# Patient Record
(demographics unavailable — no encounter records)

---

## 2024-10-30 NOTE — PHYSICAL EXAM
[de-identified] : Lumbar Physical Exam    Inspection: No evidence of scoliosis.    Palpation:   Tenderness: Moderate-significant Location: Right-sided lumbar paraspinals    ROM Flexion: Limited Extension: Limited Rotation: normal  Lateral Bending: normal     Straight leg test: Positive Slump test: Positive  Sensation: Normal to light touch   Motor: 5/5 strength throughout L2-S1  Deep tendon reflexes are symmetrical, 2+ on bilateral knee, 2+ on bilateral ankles    Clonus: Negative Babinski: Negative [de-identified] : XR of lumbar spine Date: 10/29/2024   Views: 2 views Performed at [facility]: Yes Impression: No fracture no dislocation good alignment.  These images were personally reviewed with original findings documented as above.

## 2024-10-30 NOTE — HISTORY OF PRESENT ILLNESS
[de-identified] : YENY AMARAL is a 55 year old female presenting with 2 to 3-month history of right-sided shoulder pain she is right-hand dominant.  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  She says the right shoulder pain started atraumatically and has become significantly painful.  She states she cannot reach behind her back or overhead without significant pain and feels that the arm is stuck in certain positions.  She is here today for further evaluation.  Interval history: Patient states she had significant improvement in her shoulder pain from injection and medicine but did not go to physical therapy.  Is having an acute on chronic flareup of low back pain radiating down her right leg that has become unbearable over the last week.  Denies any specific incident but states she has had similar issues in the past.  Endorses burning pain numbness and tingling down to the foot.

## 2024-10-30 NOTE — DISCUSSION/SUMMARY
[de-identified] : YENY AMARAL is a 55 year old female presenting with 2 to 3-month history of progressive right-sided shoulder pain and decreased range of motion consistent with adhesive capsulitis.  X-ray also reveals mild glenohumeral joint osteoarthritis.  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  Patient with interval significant improvement of shoulder pain however here for new complaint of acute on chronic right sided radiating significant low back pain consistent with acute on chronic lumbar radiculopathy.  Plan: 1.  Lumbar MRI ordered and referral to pain management for possible epidural intervention 2.  Diclofenac 75 mg refilled 3.  Gabapentin 300 mg nightly prescribed can increase to 600 mg if tolerated for 1 week 4.  Patient will follow-up with pain management

## 2024-11-20 NOTE — PHYSICAL EXAM
[FreeTextEntry1] : General exam   Constitutional: The patient appears well-developed, well-nourished, and in no apparent distress. Patient is well-groomed.    Skin: The skin is warm and dry, with normal turgor.  Eyes: PERRL.    ENMT: Ears: Hearing is grossly within normal limits.    Neck: Supple: The neck is supple.    Respiratory: Inspection: Breathing unlabored.    Neurologic: Alert and oriented x 3.   Psychiatric: Patient is cooperative and appropriate.  Mood and affect are normal.  Patient's insight is good, and memory and judgment are intact.  LUMBAR EXAM  APPEARANCE: No visible scars Truncal obesity No gross deformity or malalignment No erythema, swelling or ecchymosis Normal lumbar lordosis No muscle atrophy of the left lower extremity No muscle atrophy of the right lower extremity  TENDERNESS: +trigger points over RIGHT lumbar paraspinal muscles Absent over midline spinous processes Absent over left lumbar facet joints Absent over right lumbar facet joints  Absent over left lumbar paraspinal muscles: erector spinae and quadratus lumborum +over right lumbar paraspinal muscles: erector spinae and quadratus lumborum Absent over left sacroiliac joint Absent over right sacroiliac joint  ROM: Limited A/PROM of the lumbar spine +pain with flexion, extension of the lumbar spine +hamstring tightness on the left +hamstring tightness on the right  SPECIAL TESTS: Normal left straight leg raising test +right straight leg raising test FABERE left normal FABERE right normal FADIR left normal FADIR right normal  SENSORY TESTING: Intact to light touch Left L1-S2 Intact to light touch Right L1-S2  MOTOR TESTING: Muscle tone of the left lower extremity is normal Muscle tone of the right lower extremity is normal  Left hip flexion strength is 5/5 Right hip flexion strength is 5/5  Left quadriceps strength is 5/5 Right quadriceps strength is 5/5  Left ankle dorsiflexion strength is 5/5 Right ankle dorsiflexion strength is 5/5  Left ankle plantar flexion strength is 5/5 Right ankle plantar flexion strength is 5/5  REFLEXES: Patella (L4) left 0+ Patella (L4) right 0+   GAIT: Non-antalgic gait Able to walk on toes Able to walk on heels Balance normal with ambulation

## 2024-11-20 NOTE — ASSESSMENT
[FreeTextEntry1] : Ms. YENY AMARAL is a 55 year F with pain in the lower back on the RIGHT with radiation down the leg to the FOOT. She reports chronic long standing pain and is noting an acute on chronic exacerbation of this pain due to lumbar radiculopathy. There are no myelopathic signs on today's exam.  Patient reassured and educated on the diagnosis and treatment options. Risks and benefits of treatment and of delaying treatment discussed with patient. Risks discussed include but not limited to: progression of symptoms, worsening pain and functional status, etc.  This note was generated using Dragon medical dictation software. A reasonable effort had been made for proofreading its contents, but spelling mistakes or grammatical errors may still remain. If there are any questions or points of clarification needed please notify my office.  Chart reviewed Dr. Jimenez's note reviewed in HPI MRI L spine reviewed: "L4-L5: Moderate-sized right paracentral disc herniation which contacts the descending right L5 nerve root"  Start Diclofenac 75mg PO q12 x 30 days PRN pain with food #60. Denies CKD, CAD, or gastritis. Recommend that if patient develops GI symptoms including abdominal pain, nausea, or vomiting to discontinue use of medication immediately.  Patient was prescribed Gabapentin 300mg PO QHS for neuropathic pain. Monitor for sedation, dizziness and any signs of respiratory depression. Avoid taking together with opioid pain medicines and other drugs that depress the central nervous system function. Avoid sudden cessation after prolonged use, due to risk of seizures. Risks and benefits were explained and patient expressed understanding.  Sending patient for PT for LOWER BACK PAIN to help relieve pain and improve function. Stretching, strengthening, ROM, home education and other appropriate interventions. Precautions include fall prevention.  Follow up 1-2 months  Patient was advised if the following symptoms develop: chills, fever, loss of bladder control, bowel incontinence or urinary retention, numbness/tingling or weakness is present in upper or lower extremities, to go to the nearest emergency room. This may be a new clinical condition not present at the time of the patient visit that may lead to paralysis and/or death. Patient advised if the above symptoms developed to also call the office immediately to inform us and to go to the nearest emergency room.

## 2024-11-20 NOTE — HISTORY OF PRESENT ILLNESS
[Back] : back [___ yrs] : [unfilled] year(s) ago [5] : a current pain level of 5/10 [7] : a maximum pain level of 7/10 [Sharp] : sharp [Shooting] : shooting [Right] : right [Thigh] : thigh [Calf] : calf [Tingling] : tingling [Standing] : standing [Walking] : walking [Other: ___] : [unfilled] [Improved] : have improved [Medications] : medications [Did the prior interventions help?] : The intervention(s) helped [FreeTextEntry1] : YENY AMARAL is an 55 year old F here for initial evaluation of right lower back pain. Patient is a high school . She states the pain initially started about a year ago and was intermittent. She had a recent flare up about a month ago, 7/10 in severity. She saw Dr. Jimenez and had x-rays and MRI done. She was prescribed Diclofenac and Gabapentin for the pain, which she states she took for about a week and improved her symptoms, 5/10 in severity currently. Physical therapy was recommended however patient did not attend due to busy schedule.   Pain location: Right lower back Quality: Sharp, Shooting Radiation: Posterior right leg Severity: 5/10 Onset: 1 year ago. Recent flare up about a month ago. Associated symptoms: Tingling Numbness: Admits, right leg Weakness: Denies Exacerbated by: Standing, walking, turning in bed Improved by: lifting right foot when standing Bowel or bladder involvement: Denies   The pain interferes with function, ADLs and quality of life: Admits   Denies bowel/bladder dysfunction, saddle anesthesia, fevers, chills, weight loss, night pain, or night sweats at this time.  Patient had tried diclofenac and gabapentin without any lasting relief of pain.   Patient had imaging studies to evaluate the pain. Patient had not had any nerve conduction studies to evaluate the symptoms. Patient had not tried physical therapy, and/or physician directed home exercises, stretching, lifestyle modification [de-identified] : tingling [FreeTextEntry9] : Recent flare up has been improving

## 2025-01-09 NOTE — PHYSICAL EXAM
[Alert] : alert [Oriented to Person] : oriented to person [Oriented to Place] : oriented to place [Oriented to Time] : oriented to time [Calm] : calm [de-identified] : She  is alert, well-groomed, and in NAD   [de-identified] : anicteric.  Nasal mucosa pink, septum midline. Oral mucosa pink.  Tongue midline, Pharynx without exudates.   [de-identified] : Neck supple. Trachea midline. Thyroid isthmus barely palpable, lobes not felt.   [de-identified] :  right forehead mass is  mobile, Firm,  Smooth, non-tender,   Well defined.  deep. No palpable lymph nodes.   Mass size - 4 cm x 4  cm.

## 2025-01-09 NOTE — HISTORY OF PRESENT ILLNESS
[de-identified] : This is a 56 year  old patient who was referred by Dr. Malachi Muir  with the chief complaint of having  right forehead mass.  She reports having this condition for over 30 years She reports hitting her head on the dashboard.    She denies any fever or  night sweats. Appetite is good and weight is stable.  She states that recently the mass started to  get  bigger and  more symptomatic. She wants to know if it could  be surgically  removed.

## 2025-01-09 NOTE — CONSULT LETTER
[Dear  ___] : Dear  [unfilled], [Consult Letter:] : I had the pleasure of evaluating your patient, [unfilled]. [Please see my note below.] : Please see my note below. [Consult Closing:] : Thank you very much for allowing me to participate in the care of this patient.  If you have any questions, please do not hesitate to contact me. [Sincerely,] : Sincerely, [FreeTextEntry3] : Bebeto Pinto MD, FACS

## 2025-01-09 NOTE — PLAN
[FreeTextEntry1] : Ms. AMARAL  was told significance of findings, options, risks and benefits were explained.  Informed consent for excision right forehead mass , and potential risks, benefits and alternatives (surgical options were discussed including non-surgical options or the option of no surgery) to the planned surgery were discussed in depth.  All surgical options were discussed including non-surgical treatments.  She wishes to proceed with surgery.  We will plan for surgery on at the next available date, pending any required insurance pre-certification or pre-approval. She agrees to obtain any necessary pre-operative evaluations and testing prior to surgery. Patient advised to seek immediate medical attention with any acute change in symptoms or with the development of any new or worsening symptoms.  Patient's questions and concerns addressed to patient's satisfaction, and patient verbalized an understanding of the information discussed.

## 2025-01-15 NOTE — DISCUSSION/SUMMARY
[de-identified] : YENY AMARAL is a 55 year old female presenting with 2 to 3-month history of progressive right-sided shoulder pain and decreased range of motion consistent with adhesive capsulitis.  X-ray also reveals mild glenohumeral joint osteoarthritis.  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  Patient with 2-week history of left-sided neck and periscapular pain consistent with levator scapulae syndrome especially in the setting of patient carrying heavy bag on the left side of her last few weeks.  X-ray does reveal mild degenerative changes at the greater tuberosity and possible small calcific tendinopathy however does not appear to be symptomatic.  Patient is most symptomatic on her exam over the levator scapulae border.  Plan: 1.  Reassurance this pain will improve on its own 2.  Recommend continuing heating pad and warm washcloth that can be helpful for pain and spasms 3.  Patient will use diclofenac as needed 4.  Discussed possible muscle relaxer however patient would like to avoid 5.  Patient will follow-up as needed

## 2025-01-15 NOTE — HISTORY OF PRESENT ILLNESS
[de-identified] : YENY AMARAL is a 55 year old female presenting with 2 to 3-month history of right-sided shoulder pain she is right-hand dominant.  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  She says the right shoulder pain started atraumatically and has become significantly painful.  She states she cannot reach behind her back or overhead without significant pain and feels that the arm is stuck in certain positions.  She is here today for further evaluation.  Interval history: Patient states she has had significant improvement in terms of the right shoulder however is here today for left-sided shoulder pain.  She states it started 2 weeks ago and is mostly at the base of the neck down to the shoulder blade.  She states she has been carrying her bag on the left side more frequently and is not sure if that caused the issue.  She also had COVID 1 week ago which may have exacerbated the symptoms.  She is here today for further evaluation.

## 2025-01-15 NOTE — PHYSICAL EXAM
[de-identified] : Shoulder (left)   Inspection  Skin: normal  Scapular winging: none   Palpation  Tenderness: Moderate Location: Levator scapula insertion of the medial border, radiating up to the base of the neck  ROM  Flexion- normal  Abduction - normal  Rotation (internal) - normal  Rotation (external) - normal    Motor Strength  Flexion- 5/5  Abduction - 5/5  Rotation (internal) - 5/5  Rotation (external) - 5/5   Stability- normal  Sensory index- normal   Special Tests  Impingement-Cristina and Neer's positive Neer's, negative Cristina Empty Can/Painful Arc-negative Speed test- normal  Liftoff- normal Obriens test-normal SAT/SRT-negative [de-identified] : XR of left shoulder Date: 1/15/2025   Views: 3 views Performed at [facility]: Yes Impression: Mild degenerative changes noted at the greater tuberosity with possible very small mild calcific tendinopathy.  These images were personally reviewed with original findings documented as above.

## 2025-02-19 NOTE — ASSESSMENT
[FreeTextEntry1] : Pt is a 57 y/o female with HTN, obesity and hypothyroidism.  Subclinical hypothyroidism Saw Dr. Rider briefly around COVID. Took 50mcg levothyroxine. Vacationed in [place] and took 100mcg (self-increased dose, didn't need Rx over there). Hadn't been on any thyroid medicine for a few months. PCP started 25mcg LT4 for subclinical hypothyroidism but patient didn't have any symptoms before starting and feels the same on it so wants to do trial off. I agree with this approach.  Obesity -Referral to RD declined -Advised caution with use of compound pharmacies. Will try for zepbound 2.5mg weekly x4 weeks then increase to 5 mg weekly if tolerating lower dose. Discussed risks/benefits/side effects/alternatives. No hx of pancreatitis. No longer has gallbladder due to stones), or personal or FHx of thyroid cancer or pancreatic cancer.  Hyperlipidemia Possible FH Sept 2024 lipoprotein a significantly elevated at 178.6 with  with low HDL at 39 and normal triglycerides. This cholesterol panel places patient at significant risk for cardiovascular disease and so patient started Crestor 40 mg daily. Will also consider genetic testing for FH. She reports she is deconditioned but otherwise does not have significant dyspnea on exertion. When she exerts herself she hears pounding in her chest that radiates up to her ears but no actual chest pain or pressure. No nausea or vomiting. She does not eat meat or fried foods. Does not eat a lot of cheese. States carbs is her weakness, not fat. Her mother is on atorvastatin and has heart failure but otherwise no family history of early heart disease. Eldest son (35 y/o) was placed on cholesterol medication and 15 y/o son had slightly above average for age. Will get CAC score. Repeat , HDL 52, nonHDL 159. -Continue crestor 40mg daily and get CAC scoring -Has an appt to see cardio Dr. Mendez.  RTC 6 months.  Joey Kingsley DO.

## 2025-02-19 NOTE — HISTORY OF PRESENT ILLNESS
[FreeTextEntry1] : Pt is a 57 y/o female with HTN, obesity and hypothyroidism.  First diagnosed with hypothyroidism in 2018. Was gaining weight, couldn't lose it.  Saw Dr. Rider briefly around COVID. Took 50mcg levothyroxine. Vacationed in [place] and took 100mcg (self-increased dose, didn't need Rx over there). Hadn't been on any thyroid medicine for a few months. PCP started 25mcg LT4 for subclinical hypothyroidism but patient didn't have any symptoms before starting and feels the same on it so wants to do trial off. I agree with this approach.  Still with difficulty losing weight. Has heat intolerance. Energy okay. LMP 2 years ago. No compressive symptoms. Takes Vit D and iron intermittently, B12. Takes collagen. Intermittently fasts. Not exercising. No longer using compounded semaglutide. No use of lithium, amiodarone, hx of radiation to head or neck. No prior MOODY, thyroid surgery or prior treatment of hyperthyroidism. No hx of CAD, arrhythmia, CHF.  Sept 2024 lipoprotein a significantly elevated at 178.6 with  with low HDL at 39 and normal triglycerides. This cholesterol panel places patient at significant risk for cardiovascular disease and so patient started Crestor 40 mg daily. Will also consider genetic testing for FH. She reports she is deconditioned but otherwise does not have significant dyspnea on exertion. When she exerts herself she hears pounding in her chest that radiates up to her ears but no actual chest pain or pressure. No nausea or vomiting. She does not eat meat or fried foods. Does not eat a lot of cheese. States carbs is her weakness, not fat. Her mother is on atorvastatin and has heart failure but otherwise no family history of early heart disease. Eldest son (35 y/o) was placed on cholesterol medication and 13 y/o son had slightly above average for age. Will get CAC score. Repeat , HDL 52, nonHDL 159.  FHx: Mother with heart failure. Mom and sister with hypothyroidism.

## 2025-02-19 NOTE — PHYSICAL EXAM
[Alert] : alert [Well Nourished] : well nourished [No Acute Distress] : no acute distress [EOMI] : extra ocular movement intact [No Proptosis] : no proptosis [No Respiratory Distress] : no respiratory distress [No Accessory Muscle Use] : no accessory muscle use [Normal Rate and Effort] : normal respiratory rate and effort [de-identified] : Possible arcus cornealis. [de-identified] : No xanthelasmas. Possible xanthoma on right DIP.

## 2025-04-14 NOTE — PHYSICAL EXAM
[Alert] : alert [Conjunctiva] : the conjunctiva were normal in both eyes [PERRL] : pupils were equal in size, round, and reactive to light [EOM Intact] : extraocular movements were intact [Normal Appearance] : was normal in appearance [Neck Supple] : was supple [Enlarged Diffusely] : was not enlarged [Rate ___] : at [unfilled] breaths per minute [Normal Rhythm/Effort] : normal respiratory rhythm and effort [Clear Bilaterally] : the lungs were clear to auscultation bilaterally [Normal to Percussion] : the lungs were normal to percussion [Normal Rate] : normal [Heart Rate ___] : [unfilled] bpm [Rhythm Regular] : regular [Normal S1] : normal S1 [Normal S2] : normal S2 [S3] : no S3 [S4] : no S4 [No Murmur] : no murmurs heard [No Pitting Edema] : no pitting edema present [Rt] : no varicose veins of the right leg [Lt] : no varicose veins of the left leg [Right Carotid Bruit] : no bruit heard over the right carotid [Left Carotid Bruit] : no bruit heard over the left carotid [Right Femoral Bruit] : no bruit heard over the right femoral artery [Left Femoral Bruit] : no bruit heard over the left femoral artery [2+] : left 2+ [No Abnormalities] : the abdominal aorta was not enlarged and no bruit was heard [Bruit] : no bruit heard [Scar] : a scar was noted [RUQ] : in the right upper quadrant [Periumbilical] : in the periumbilical area [Suprapubic] : in the suprapubic area [Soft, Nontender] : the abdomen was soft and nontender [No Mass] : no masses were palpated [No HSM] : no hepatosplenomegaly noted [None] : no CVA tenderness [Postauricular Lymph Nodes Enlarged Bilaterally] : nodes not enlarged [Preauricular Lymph Nodes Enlarged Bilaterally] : nodes not enlarged [Submandibular Lymph Nodes Enlarged Bilaterally] : nodes not enlarged [Suboccipital Lymph Nodes Enlarged Bilaterally] : nodes not enlarged [Submental Lymph Nodes Enlarged] : nodes not enlarged [Cervical Lymph Nodes Enlarged Posterior Bilaterally] : nodes not enlarged [Cervical Lymph Nodes Enlarged Anterior Bilaterally] : nodes not enlarged [Supraclavicular Lymph Nodes Enlarged Bilaterally] : nodes not enlarged [Axillary Lymph Nodes Enlarged Bilaterally] : nodes not enlarged [Epitrochlear Lymph Nodes Enlarged Bilaterally] : nodes not enlarged [Femoral Lymph Nodes Enlarged Bilaterally] : nodes not enlarged [Inguinal Lymph Nodes Enlarged Bilaterally] : nodes not enlarged [No Lymphangitis] : no lymphangitis observed [Normal Kyphosis] : normal kyphosis [No Visual Abnormalities] : no visible abnormalities [Normal Lordosis] : normal lordosis [No Scoliosis] : no scoliosis [No Tenderness to Palpation] : no spine tenderness on palpation [No Masses] : no masses [Full ROM] : full ROM [No Pain with ROM] : no pain with motion in any direction [Intact] : all reflexes within normal limits bilaterally [Normal Station and Gait] : the gait and station were normal [Normal Motor Tone] : the muscle tone was normal [Involuntary Movements] : no involuntary movements were seen [Normal Scalp] : inspection of the scalp showed no abnormalities [Examination Of The Hair] : texture and distribution of hair was normal [Abnormal Color] : normal color and pigmentation [Complexion Medium] : medium complexion [Skin Lesions 1] : no skin lesions were observed [Tattoo - Single] : no tattoos observed [Skin Turgor Decreased] : normal skin turgor [Normal] : the deep tendon reflexes were normal [Normal Mental Status] : the patient's orientation, memory, attention, language and fund of knowledge were normal [Appropriate] : appropriate [Impaired judgment] : intact judgment [Impaired Insight] : intact insight [de-identified] : tongue normal  teeth in good repair  [de-identified] : bilateral breast exam sitting and supine   sp reduction cystic  bilateral  ana luisa around areola   at 3 ock on left

## 2025-04-14 NOTE — ASSESSMENT
[FreeTextEntry1] : 1 colon cancer screening  WE discussed procedure and will return prior for blood testing and instruction.  Colon and rectal cancer screening is a way in which doctors check the colon and rectum for signs of cancer or growths (called polyps) that might become cancer. It is done in people who have no symptoms and no reason to think they have cancer. The goal is to find and remove polyps before they become cancer, or to find cancer early, before it grows, spreads, or causes problems.  The colon and rectum are the last part of the digestive tract (figure 1). When doctors talk about colon and rectal cancer screening, they use the term "colorectal." That is just a shorter way of saying "colon and rectal." It's also possible to say just colon cancer screening.  Studies show that having colon cancer screening lowers the chance of dying from colon cancer. There are several different types of screening test that can do this.  What are the different screening tests for colon cancer?  They include:  ?Colonoscopy  Colonoscopy allows the doctor to see directly inside the entire colon. Before you can have a colonoscopy, you must clean out your colon. You do this at home by drinking a special liquid that causes watery diarrhea for several hours. On the day of the test, you get medicine to help you relax. Then a doctor puts a thin tube into your anus and advances it into your colon (figure 2). The tube has a tiny camera attached to it, so the doctor can see inside your colon. The tube also has tiny tools on the end, so the doctor can remove pieces of tissue or polyps if they are there. After polyps or pieces of tissue are removed, they are sent to a lab to be checked for cancer.   Advantages of this test  Colonoscopy finds most small polyps and almost all large polyps and cancers. If found, polyps can be removed right away. This test gives the most accurate results. If any other screening tests are done first and come back positive, a colonoscopy will need to be done for follow-up. If you have a colonoscopy as your first test, you will probably not need a second follow-up test soon after.   Drawbacks to this test  Colonoscopy has some small risks. It can cause bleeding or tear the inside of the colon, but this only happens in 1 out of 1,000 people. Also, cleaning out the bowel beforehand can be unpleasant. Plus, people usually cannot work or drive for the rest of the day after the test, because of the relaxation medicine they must take during the test.   Sigmoidoscopy  A sigmoidoscopy is similar to a colonoscopy. The difference is that this test looks only at the last part of the colon, and a colonoscopy looks at the whole colon. Before you have a sigmoidoscopy, you must clean out the lower part of your colon using an enema. This bowel cleaning is not as thorough or unpleasant as the one for colonoscopy. For this test, you do not need to take medicines to help you relax, so you can drive and work afterward if you want.   Advantages of this test  Sigmoidoscopy can find polyps and cancers in the rectum and the last part of the colon. If polyps are found, they can be removed right away.   Drawbacks to this test  In about 2 out of 10,000 people, sigmoidoscopy tears the inside of the colon. The test also can't find polyps or cancers that are in the part of the colon the test does not view (figure 3). If doctors find polyps or cancer during a sigmoidoscopy, they usually follow up with a colonoscopy.   CT colonography (also known as virtual colonoscopy or CTC)  CTC looks for cancer and polyps using a special X-ray called a "CT scan." For most CTC tests, the preparation is the same as it is for colonoscopy.   Advantages of this test  CTC can find polyps and cancers in the whole colon without the need for medicines to relax.   Drawbacks to this test  If doctors find polyps or cancer with CTC, they usually follow up with a colonoscopy. CTC sometimes finds areas that look abnormal but that turn out to be healthy. This means that CTC can lead to tests and procedures you did not need. Plus, CTC exposes you to radiation. In most cases, the preparation needed to clean the bowel is the same as the one needed for a colonoscopy. The test is expensive, and some insurance companies might not cover this test for screening.   Stool test for blood  "Stool" is another word for bowel movements. Stool tests most commonly check for blood in samples of stool. Cancers and polyps can bleed, and if they bleed around the time you do the stool test, then blood will show up on the test. The test can find even small amounts of blood that you can't see in your stool. Other less serious conditions can also cause small amounts of blood in the stool, and that will show up in this test. You will have to collect small samples from your bowel movements, which you will put in a special container you get from your doctor or nurse. Then you follow the instructions to mail the container out for the testing.   Advantages of this test  This test does not involve cleaning out the colon or having any procedures.   Drawbacks to this test  Stool tests are less likely to find polyps than other screening tests. These tests also often come up abnormal even in people who do not have cancer. If a stool test shows something abnormal, doctors usually follow up with a colonoscopy.   Stool DNA test  The stool DNA test checks for genetic markers of cancer, as well as for signs of blood. For this test, you get a special kit in order to collect a whole bowel movement. Then you follow the instructions about how and where to ship it.   Advantages of this test  This test does not involve cleaning out the colon or having any procedures. When cancer is not present, it is less likely to be falsely abnormal than a stool test for blood. That means it leads to fewer unnecessary colonoscopies.   Drawbacks to this test  It might be unpleasant to collect and ship a whole bowel movement. If a DNA test shows something abnormal, doctors usually follow up with a colonoscopy.   There is no blood test that most experts think is accurate enough to use for screening.  How do I choose which test to have?  Work with your doctor or nurse to decide which test is best for you. Some doctors might choose to combine screening tests, for example, sigmoidoscopy plus stool testing for blood. Being screenedno matter howis more important than which test you choose.  Who should be screened for colon cancer?  Doctors recommend that most people begin having colon cancer screening at age 50. People who have an increased risk of getting colon cancer sometimes begin screening at a younger age. That might include people with a strong family history of colon cancer, and people with diseases of the colon called "Crohn's disease" and "ulcerative colitis."  Most people can stop being screened around the age of 75, or at the latest 85.  How often should I be screened?  That depends on your risk of colon cancer and which test you have. People who have a high risk of colon cancer often need to be tested more often and should have a colonoscopy.  Most people are not at high risk, so they can choose one of these schedules:  Colonoscopy every 10 years  CT colonography (CTC) every 5 years  Sigmoidoscopy every 5 to 10 years  Stool testing for blood once a year  Stool DNA testing every 3 years (but doctors are not yet sure of the best time frame) The risks, benefits, and alternatives were explained in detail to the patient. Risks including but not limited to bleeding, perforation, adverse reaction to medications, cardiopulmonary compromise and their attendant sequalae explained. Sequelae including but not limited to need for surgery, colostomy, prolonged hospital stay, placement of drainage tubes, blood transfusions, disability, morbidity and mortality was explained.  It has been made clear to the patient that although colonoscopy is still considered the best test to screen for colon cancer and polyps, no test is 100% accurate. He/she indicated understanding of the above and wishes to proceed.  All questions and concerns have been addressed.  [id number]   risks of obesity and need for diet and eating healthy Obesity is a complex disorder involving an excessive amount of body fat. Obesity isn't just a cosmetic concern. It increases your risk of diseases and health problems, such as heart disease, diabetes and high blood pressure.  Being extremely obese means you are especially likely to have health problems related to your weight.   The good news is that even modest weight loss can improve or prevent the health problems associated with obesity. Dietary changes, increased physical activity and behavior changes can help you lose weight. Prescription medications and weight-loss surgery are additional options for treating obesity.     Symptoms  Obesity is diagnosed when your body mass index (BMI) is 30 or higher. Your body mass index is calculated by dividing your weight in kilograms (kg) by your height in meters (m) squared.    BMI  Weight status   Below 18.5 Underweight  18.5-24.9 Normal  25.0-29.9 Overweight  30.0-34.9 Obese (Class I)  35.0-39.9 Obese (Class II)  40.0 and higher Extreme obesity (Class III)   For most people, BMI provides a reasonable estimate of body fat. However, BMI doesn't directly measure body fat, so some people, such as muscular athletes, may have a BMI in the obese category even though they don't have excess body fat. Ask your doctor if your BMI is a problem.   When to see a doctor  If you think you may be obese, and especially if you're concerned about weight-related health problems, see your doctor or health care provider. You and your provider can evaluate your health risks and discuss your weight-loss options.    Causes  Although there are genetic, behavioral and hormonal influences on body weight, obesity occurs when you take in more calories than you burn through exercise and normal daily activities. Your body stores these excess calories as fat.  Obesity can sometimes be traced to a medical cause, such as Prader-Willi syndrome, Cushing's syndrome, and other diseases and conditions. However, these disorders are rare and, in general, the principal causes of obesity are: Inactivity. If you're not very active, you don't burn as many calories. With a sedentary lifestyle, you can easily take in more calories every day than you use through exercise and normal daily activities. Unhealthy diet and eating habits. Weight gain is inevitable if you regularly eat more calories than you burn. And most Americans' diets are too high in calories and are full of fast food and high-calorie beverages.  Risk factors  Obesity usually results from a combination of causes and contributing factors, including: Genetics. Your genes may affect the amount of body fat you store, and where that fat is distributed. Genetics may also play a role in how efficiently your body converts food into energy and how your body burns calories during exercise. Family lifestyle. Obesity tends to run in families. If one or both of your parents are obese, your risk of being obese is increased. That's not just because of genetics. Family members tend to share similar eating and activity habits. Inactivity. If you're not very active, you don't burn as many calories. With a sedentary lifestyle, you can easily take in more calories every day than you burn through exercise and routine daily activities. Having medical problems, such as arthritis, can lead to decreased activity, which contributes to weight gain. Unhealthy diet. A diet that's high in calories, lacking in fruits and vegetables, full of fast food, and laden with high-calorie beverages and oversized portions contributes to weight gain. Medical problems. In some people, obesity can be traced to a medical cause, such as Prader-Willi syndrome, Cushing's syndrome and other conditions. Medical problems, such as arthritis, also can lead to decreased activity, which may result in weight gain. Certain medications. Some medications can lead to weight gain if you don't compensate through diet or activity. These medications include some antidepressants, anti-seizure medications, diabetes medications, antipsychotic medications, steroids and beta blockers. Social and economic issues. Research has linked social and economic factors to obesity. Avoiding obesity is difficult if you don't have safe areas to exercise. Similarly, you may not have been taught healthy ways of cooking, or you may not have money to buy healthier foods. In addition, the people you spend time with may influence your weight  you're more likely to become obese if you have obese friends or relatives. Age. Obesity can occur at any age, even in young children. But as you age, hormonal changes and a less active lifestyle increase your risk of obesity. In addition, the amount of muscle in your body tends to decrease with age. This lower muscle mass leads to a decrease in metabolism. These changes also reduce calorie needs, and can make it harder to keep off excess weight. If you don't consciously control what you eat and become more physically active as you age, you'll likely gain weight. Pregnancy. During pregnancy, a woman's weight necessarily increases. Some women find this weight difficult to lose after the baby is born. This weight gain may contribute to the development of obesity in women. Quitting smoking. Quitting smoking is often associated with weight gain. And for some, it can lead to enough weight gain that the person becomes obese. In the long run, however, quitting smoking is still a greater benefit to your health than continuing to smoke. Lack of sleep. Not getting enough sleep or getting too much sleep can cause changes in hormones that increase your appetite. You may also crave foods high in calories and carbohydrates, which can contribute to weight gain.  Even if you have one or more of these risk factors, it doesn't mean that you're destined to become obese. You can counteract most risk factors through diet, physical activity and exercise, and behavior changes.  Complications  If you're obese, you're more likely to develop a number of potentially serious health problems, including: High triglycerides and low high-density lipoprotein (HDL) cholesterol Type 2 diabetes High blood pressure Metabolic syndrome  a combination of high blood sugar, high blood pressure, high triglycerides and low HDL cholesterol Heart disease Stroke Cancer, including cancer of the uterus, cervix, endometrium, ovaries, breast, colon, rectum, esophagus, liver, gallbladder, pancreas, kidney and prostate Breathing disorders, including sleep apnea, a potentially serious sleep disorder in which breathing repeatedly stops and starts Gallbladder disease Gynecological problems, such as infertility and irregular periods Erectile dysfunction and sexual health issues Nonalcoholic fatty liver disease, a condition in which fat builds up in the liver and can cause inflammation or scarring Osteoarthritis  Quality of life  When you're obese, your overall quality of life may be diminished. You may not be able to do things you used to do, such as participating in enjoyable activities. You may avoid public places. Obese people may even encounter discrimination.  Other weight-related issues that may affect your quality of life include: Depression Disability Sexual problems Shame and guilt Social isolation Lower work achievement  Prevention  Whether you're at risk of becoming obese, currently overweight or at a healthy weight, you can take steps to prevent unhealthy weight gain and related health problems. Not surprisingly, the steps to prevent weight gain are the same as the steps to lose weight: daily exercise, a healthy diet, and a long-term commitment to watch what you eat and drink. Exercise regularly. You need to get 150 to 300 minutes of moderate-intensity activity a week to prevent weight gain. Moderately intense physical activities include fast walking and swimming. Follow a healthy eating plan. Focus on low-calorie, nutrient-dense foods, such as fruits, veg 3. hld  to lower  LDL and non HDL  cholesterol levels     1 limit your intake of foods full of saturated fats  , trans fats, and dietary cholesterol .  Food with a lot of saturated fate include butter, fatty flesh like red meat, full fat and low fat dairy  products  , palm oil and coconut oil .   If you see partially hydrogenated fat in the ingredient  list of food label that food has trans fats.  Top sources of dietary chol include egg yolks , organ meats and shell fish.  one Type of fat omega 3 Fatty acids has been shown to protect against heart disease  . Good sources are cold water fish like salmon, mackerel , halibut ., trout  herring and sardines.     Limit  your intake of meat , poultry and fish to no more than 3.5  ounces per day     Eat a lot more fiber rich foods  like beans , oats, barley fruits and vegetables   .  Food naturally rich in soluble fiber  are good at lowering cholesterol .    Excellent choices  are  oats , oat bran , barley , peas , yams sweet potatoes and legumes  or beans .  Good fruit sources are berries passion fruit, oranges pears,, apricots , apples  and nectarines  .    choose protein rich plant foods   such as legumes or beans nuts and seeds over meat.     Lose as much weight as possible and exercise   Take plant sterol supplements   .A Daily intake  of 1-2 gms  of plant sterols  lowers ldl .    Best choice is supplements  such as Cholestoff  by natures made   because they dont have calories  sugar trans fats   an salt  of many foods enriched with plant sterols.    Take  Metamucil or psyllium   .   Studies have shown 9-10 gms as daily of psyllium   the equivalent of  about  3 teaspoons  of sugar free Metamucil   reduced LDL levels  . 4.  hypothyroid  Hypothyroidism is a condition that makes you feel tired.  There is a gland in your neck called the thyroid gland. It makes thyroid hormone. This hormone controls how the body uses and stores energy (figure 1).  Hypothyroidism is the medical term for when a person does not make enough thyroid hormone. People sometimes confuse this condition with HYPERthyroidism, which is when a person makes too much thyroid hormone. Some people with hypothyroidism have no symptoms. But most people feel tired. That can make the condition hard to diagnose, because a lot of conditions can make you tired.  Other symptoms of hypothyroidism include:  ?Lack of energy  ?Getting cold easily  ?Developing coarse or thin hair  ?Getting constipated (having too few bowel movements)   If it is not treated, hypothyroidism can also weaken and slow your heart. This can make you feel out of breath or tired when you exercise and cause swelling (fluid buildup) in your ankles. Untreated hypothyroidism can also increase your blood pressure and raise your cholesterol  both of which increase the risk of heart trouble.  In women, hypothyroidism can disrupt monthly periods. It can also make it hard to get pregnant. In women who do get pregnant, hypothyroidism can cause problems. For instance, it can increase the chances of having a miscarriage. (A miscarriage is when a pregnancy ends on its own before the woman has been pregnant for 20 weeks.)Treatment for hypothyroidism involves taking thyroid hormone pills every day. After you take the pills for about 6 weeks, your doctor or nurse will test your blood to make sure the levels are where they should be. They might adjust your dose depending on the results. Most people with hypothyroidism need to be on thyroid pills for the rest of their life.  Thyroid hormone pills come in different brand name and generic forms. All the pills work equally well. But you should not switch from one generic or brand name to another. Switching between pills can cause your levels to go up and down.  Never change your dose of thyroid hormone on your own. Taking too much thyroid hormone can cause heart rhythm problems and even damage your bones.

## 2025-05-15 NOTE — REASON FOR VISIT
[Family Member] : family member [FreeTextEntry1] : I had the pleasure evaluating your patient Ms. Patricia Anaya who you had kindly referred for her initial cardiovascular and pre-operative cardiovascular consultation.  She is a 55 yo woman with recent incidental finding of a pericardial effusion on calcium scoring CT scan. Cardiac CT calcium score was 0 on a scan performed in March 2025, which also noted a small pericardial effusion.  She is planning to undergo elective right forehead mass excision.  She reports remaining asymptomatic from the cardiac perspective. She denies having any recent constitutional symptoms. She had COVID in January.  She states that she is UTD on her maintenance screens (breast, PAP). She is pending colonoscopy.  Denies current toxic habits.  No significant FH early CAD/SCD/CVA/VTE.  My review of her 12-lead ECG demonstrates SR, normal axis, normal intervals.  Physical examination is unremarkable.   Appears euvolemic on exam.  At this time, will arrange for: 1. Echocardiogram to assess cardiac structure/function as part of her initial cardiac evaluation and to re-evaluate pericardial effusion. 2. F/U after testing to review results and updated recommendations. Anticipate with an unremarkable echocardiogram and already with a recent cardiac CT calcium score of 0, the patient is medically stable from the cardiac perspective, and can proceed with surgery as outlined above, without the need for additional cardiac testing or risk stratification.  Thank you again for entrusting her care with me.    Warmest regards, Aryan Mendez

## 2025-06-24 NOTE — ASSESSMENT
[FreeTextEntry1] :  1 colon cancer screening  WE discussed procedure and will return prior for blood testing and instruction.  Colon and rectal cancer screening is a way in which doctors check the colon and rectum for signs of cancer or growths (called polyps) that might become cancer. It is done in people who have no symptoms and no reason to think they have cancer. The goal is to find and remove polyps before they become cancer, or to find cancer early, before it grows, spreads, or causes problems.  The colon and rectum are the last part of the digestive tract (figure 1). When doctors talk about colon and rectal cancer screening, they use the term "colorectal." That is just a shorter way of saying "colon and rectal." It's also possible to say just colon cancer screening.  Studies show that having colon cancer screening lowers the chance of dying from colon cancer. There are several different types of screening test that can do this.  What are the different screening tests for colon cancer?  They include:  ?Colonoscopy  Colonoscopy allows the doctor to see directly inside the entire colon. Before you can have a colonoscopy, you must clean out your colon. You do this at home by drinking a special liquid that causes watery diarrhea for several hours. On the day of the test, you get medicine to help you relax. Then a doctor puts a thin tube into your anus and advances it into your colon (figure 2). The tube has a tiny camera attached to it, so the doctor can see inside your colon. The tube also has tiny tools on the end, so the doctor can remove pieces of tissue or polyps if they are there. After polyps or pieces of tissue are removed, they are sent to a lab to be checked for cancer.   Advantages of this test  Colonoscopy finds most small polyps and almost all large polyps and cancers. If found, polyps can be removed right away. This test gives the most accurate results. If any other screening tests are done first and come back positive, a colonoscopy will need to be done for follow-up. If you have a colonoscopy as your first test, you will probably not need a second follow-up test soon after.   Drawbacks to this test  Colonoscopy has some small risks. It can cause bleeding or tear the inside of the colon, but this only happens in 1 out of 1,000 people. Also, cleaning out the bowel beforehand can be unpleasant. Plus, people usually cannot work or drive for the rest of the day after the test, because of the relaxation medicine they must take during the test.   Sigmoidoscopy  A sigmoidoscopy is similar to a colonoscopy. The difference is that this test looks only at the last part of the colon, and a colonoscopy looks at the whole colon. Before you have a sigmoidoscopy, you must clean out the lower part of your colon using an enema. This bowel cleaning is not as thorough or unpleasant as the one for colonoscopy. For this test, you do not need to take medicines to help you relax, so you can drive and work afterward if you want.   Advantages of this test  Sigmoidoscopy can find polyps and cancers in the rectum and the last part of the colon. If polyps are found, they can be removed right away.   Drawbacks to this test  In about 2 out of 10,000 people, sigmoidoscopy tears the inside of the colon. The test also can't find polyps or cancers that are in the part of the colon the test does not view (figure 3). If doctors find polyps or cancer during a sigmoidoscopy, they usually follow up with a colonoscopy.   CT colonography (also known as virtual colonoscopy or CTC)  CTC looks for cancer and polyps using a special X-ray called a "CT scan." For most CTC tests, the preparation is the same as it is for colonoscopy.   Advantages of this test  CTC can find polyps and cancers in the whole colon without the need for medicines to relax.   Drawbacks to this test  If doctors find polyps or cancer with CTC, they usually follow up with a colonoscopy. CTC sometimes finds areas that look abnormal but that turn out to be healthy. This means that CTC can lead to tests and procedures you did not need. Plus, CTC exposes you to radiation. In most cases, the preparation needed to clean the bowel is the same as the one needed for a colonoscopy. The test is expensive, and some insurance companies might not cover this test for screening.   Stool test for blood  "Stool" is another word for bowel movements. Stool tests most commonly check for blood in samples of stool. Cancers and polyps can bleed, and if they bleed around the time you do the stool test, then blood will show up on the test. The test can find even small amounts of blood that you can't see in your stool. Other less serious conditions can also cause small amounts of blood in the stool, and that will show up in this test. You will have to collect small samples from your bowel movements, which you will put in a special container you get from your doctor or nurse. Then you follow the instructions to mail the container out for the testing.   Advantages of this test  This test does not involve cleaning out the colon or having any procedures.   Drawbacks to this test  Stool tests are less likely to find polyps than other screening tests. These tests also often come up abnormal even in people who do not have cancer. If a stool test shows something abnormal, doctors usually follow up with a colonoscopy.   Stool DNA test  The stool DNA test checks for genetic markers of cancer, as well as for signs of blood. For this test, you get a special kit in order to collect a whole bowel movement. Then you follow the instructions about how and where to ship it.   Advantages of this test  This test does not involve cleaning out the colon or having any procedures. When cancer is not present, it is less likely to be falsely abnormal than a stool test for blood. That means it leads to fewer unnecessary colonoscopies.   Drawbacks to this test  It might be unpleasant to collect and ship a whole bowel movement. If a DNA test shows something abnormal, doctors usually follow up with a colonoscopy.   There is no blood test that most experts think is accurate enough to use for screening.  How do I choose which test to have?  Work with your doctor or nurse to decide which test is best for you. Some doctors might choose to combine screening tests, for example, sigmoidoscopy plus stool testing for blood. Being screenedno matter howis more important than which test you choose.  Who should be screened for colon cancer?  Doctors recommend that most people begin having colon cancer screening at age 50. People who have an increased risk of getting colon cancer sometimes begin screening at a younger age. That might include people with a strong family history of colon cancer, and people with diseases of the colon called "Crohn's disease" and "ulcerative colitis."  Most people can stop being screened around the age of 75, or at the latest 85.  How often should I be screened?  That depends on your risk of colon cancer and which test you have. People who have a high risk of colon cancer often need to be tested more often and should have a colonoscopy.  Most people are not at high risk, so they can choose one of these schedules:  Colonoscopy every 10 years  CT colonography (CTC) every 5 years  Sigmoidoscopy every 5 to 10 years  Stool testing for blood once a year  Stool DNA testing every 3 years (but doctors are not yet sure of the best time frame)  2  preop  Pt is having a low risk procedure and is low risks for cardiac adverse outcome and cri is 0.4% .  she was explained the procedure colonoscopy, anesthesia   risks and complications alternatives , prep and post procedure care.  I have answered all her questions.  she will have blood testing today  . The risks, benefits, and alternatives were explained in detail to the patient. Risks including but not limited to bleeding, perforation, adverse reaction to medications, cardiopulmonary compromise and their attendant sequalae explained. Sequelae including but not limited to need for surgery, colostomy, prolonged hospital stay, placement of drainage tubes, blood transfusions, disability, morbidity and mortality was explained.  It has been made clear to the patient that although colonoscopy is still considered the best test to screen for colon cancer and polyps, no test is 100% accurate. He/she indicated understanding of the above and wishes to proceed.  All questions and concerns have been addressed.  3 hld to lower  LDL and non HDL  cholesterol levels     1 limit your intake of foods full of saturated fats  , trans fats, and dietary cholesterol .  Food with a lot of saturated fate include butter, fatty flesh like red meat, full fat and low fat dairy  products  , palm oil and coconut oil .   If you see partially hydrogenated fat in the ingredient  list of food label that food has trans fats.  Top sources of dietary chol include egg yolks , organ meats and shell fish.  one Type of fat omega 3 Fatty acids has been shown to protect against heart disease  . Good sources are cold water fish like salmon, mackerel , halibut ., trout  herring and sardines.     Limit  your intake of meat , poultry and fish to no more than 3.5  ounces per day     Eat a lot more fiber rich foods  like beans , oats, barley fruits and vegetables   .  Food naturally rich in soluble fiber  are good at lowering cholesterol .    Excellent choices  are  oats , oat bran , barley , peas , yams sweet potatoes and legumes  or beans .  Good fruit sources are berries passion fruit, oranges pears,, apricots , apples  and nectarines  .    choose protein rich plant foods   such as legumes or beans nuts and seeds over meat.     Lose as much weight as possible and exercise   Take plant sterol supplements   .A Daily intake  of 1-2 gms  of plant sterols  lowers ldl .    Best choice is supplements  such as Cholestoff  by natures made   because they dont have calories  sugar trans fats   an salt  of many foods enriched with plant sterols.    Take  Metamucil or psyllium   .   Studies have shown 9-10 gms as daily of psyllium   the equivalent of  about  3 teaspoons  of sugar free Metamucil   reduced LDL levels  . 4 hpn  .well controlled   5. hypothyroid   tsh level  ordered   medications on day of procedure - with sip of water 3hrs prior to procedure  take amlodipine , levothyroxine and losartan

## 2025-06-24 NOTE — PHYSICAL EXAM
[Alert] : alert [Normal Voice/Communication] : normal voice/communication [Healthy Appearing] : healthy appearing [No Acute Distress] : no acute distress [Sclera] : the sclera and conjunctiva were normal [Hearing Threshold Finger Rub Not [place]] : hearing was normal [Normal Lips/Gums] : the lips and gums were normal [Oropharynx] : the oropharynx was normal [Normal Appearance] : the appearance of the neck was normal [No Neck Mass] : no neck mass was observed [No Respiratory Distress] : no respiratory distress [No Acc Muscle Use] : no accessory muscle use [Respiration, Rhythm And Depth] : normal respiratory rhythm and effort [Auscultation Breath Sounds / Voice Sounds] : lungs were clear to auscultation bilaterally [Normal Rate] : the respiratory rate was normal [Rate ___] : at [unfilled] breaths per minute [Normal Rhythm/Effort] : normal respiratory rhythm and effort [Clear Bilaterally] : the lungs were clear to auscultation bilaterally [Normal to Percussion] : the lungs were normal to percussion [Heart Rate And Rhythm] : heart rate was normal and rhythm regular [Normal S1, S2] : normal S1 and S2 [Murmurs] : no murmurs [None] : no edema [Bowel Sounds] : normal bowel sounds [Abdomen Tenderness] : non-tender [No Masses] : no abdominal mass palpated [Abdomen Soft] : soft [Soft, Nontender] : the abdomen was soft and nontender [No Mass] : no masses were palpated [No HSM] : no hepatosplenomegaly noted [No CVA Tenderness] : no CVA  tenderness [No Spinal Tenderness] : no spinal tenderness [Normal Station and Gait] : the gait and station were normal [Normal Motor Tone] : the muscle tone was normal [Involuntary Movements] : no involuntary movements were seen [Normal Color / Pigmentation] : normal skin color and pigmentation [] : no rash [Normal Turgor] : normal skin turgor [Motor Exam] : the motor exam was normal [Normal] : oriented to person, place, and time [Oriented To Time, Place, And Person] : oriented to person, place, and time [Cervical Lymph Nodes Enlarged Posterior Bilaterally] : nodes not enlarged [Supraclavicular Lymph Nodes Enlarged Bilaterally] : nodes not enlarged [Axillary Lymph Nodes Enlarged Bilaterally] : nodes not enlarged [Inguinal Lymph Nodes Enlarged Bilaterally] : nodes not enlarged [de-identified] : homans negative bilateral .

## 2025-06-30 NOTE — PHYSICAL EXAM
[Alert] : alert [Oriented to Person] : oriented to person [Oriented to Place] : oriented to place [Oriented to Time] : oriented to time [Calm] : calm [de-identified] : She  is alert, well-groomed, and in NAD   [de-identified] : anicteric.  Nasal mucosa pink, septum midline. Oral mucosa pink.  Tongue midline, Pharynx without exudates.   [de-identified] : Neck supple. Trachea midline. Thyroid isthmus barely palpable, lobes not felt.   [de-identified] :  right forehead  Surgical wound is healing well.   no signs of  inflammation or infection.

## 2025-06-30 NOTE — ASSESSMENT
[FreeTextEntry1] : Ms. AAMRAL is doing well, with excellent post-operative recovery. The surgical incision is healing well and as expected. There is no evidence of infection or complication, and patient is progressing as expected. Post-operative wound care, activity, restrictions and precautions reinforced.   Patient instructed to  call the office  in 2 weeks for results of the pathology. Patient's questions and concerns addressed to patient's satisfaction.

## 2025-06-30 NOTE — HISTORY OF PRESENT ILLNESS
[de-identified] : Ms. AMARAL  is s/p Excision of  right Forehead Mass 06/18/2025.  Today  Ms. AMARAL offers no complaints. patient reports no fever, chills,  or  pain.  Her surgical wound is healing well. No signs of inflammation, infection or exudate.   Patient reports good bowel movements and appetite.

## 2025-06-30 NOTE — PLAN
[FreeTextEntry1] : Ms. AMARAL will follow up  if needed. Warning signs, follow up, and restrictions were discussed with the patient.   Patient instructed to  call the office  in 2 weeks for results of the pathology.